# Patient Record
Sex: FEMALE | Race: WHITE | NOT HISPANIC OR LATINO | Employment: FULL TIME | URBAN - METROPOLITAN AREA
[De-identification: names, ages, dates, MRNs, and addresses within clinical notes are randomized per-mention and may not be internally consistent; named-entity substitution may affect disease eponyms.]

---

## 2017-06-29 ENCOUNTER — PATIENT MESSAGE (OUTPATIENT)
Dept: OBSTETRICS AND GYNECOLOGY | Facility: CLINIC | Age: 57
End: 2017-06-29

## 2017-06-30 RX ORDER — ESTRADIOL 0.05 MG/D
1 PATCH TRANSDERMAL
Qty: 4 PATCH | Refills: 1 | Status: SHIPPED | OUTPATIENT
Start: 2017-06-30 | End: 2017-08-03 | Stop reason: SDUPTHER

## 2017-08-03 ENCOUNTER — OFFICE VISIT (OUTPATIENT)
Dept: OBSTETRICS AND GYNECOLOGY | Facility: CLINIC | Age: 57
End: 2017-08-03
Payer: MEDICARE

## 2017-08-03 VITALS
DIASTOLIC BLOOD PRESSURE: 72 MMHG | HEIGHT: 64 IN | BODY MASS INDEX: 33.84 KG/M2 | SYSTOLIC BLOOD PRESSURE: 144 MMHG | WEIGHT: 198.19 LBS

## 2017-08-03 DIAGNOSIS — N95.9 MENOPAUSAL AND POSTMENOPAUSAL DISORDER: ICD-10-CM

## 2017-08-03 DIAGNOSIS — Z12.31 ENCOUNTER FOR SCREENING MAMMOGRAM FOR BREAST CANCER: ICD-10-CM

## 2017-08-03 DIAGNOSIS — Z01.419 WELL WOMAN EXAM WITH ROUTINE GYNECOLOGICAL EXAM: Primary | ICD-10-CM

## 2017-08-03 PROCEDURE — 99213 OFFICE O/P EST LOW 20 MIN: CPT | Mod: PBBFAC | Performed by: OBSTETRICS & GYNECOLOGY

## 2017-08-03 PROCEDURE — 99999 PR PBB SHADOW E&M-EST. PATIENT-LVL III: CPT | Mod: PBBFAC,,, | Performed by: OBSTETRICS & GYNECOLOGY

## 2017-08-03 PROCEDURE — G0101 CA SCREEN;PELVIC/BREAST EXAM: HCPCS | Mod: S$PBB,,, | Performed by: OBSTETRICS & GYNECOLOGY

## 2017-08-03 RX ORDER — FLUTICASONE PROPIONATE 50 MCG
SPRAY, SUSPENSION (ML) NASAL
COMMUNITY
Start: 2017-05-02 | End: 2018-08-17

## 2017-08-03 RX ORDER — MULTIVITAMIN
1 TABLET ORAL
COMMUNITY

## 2017-08-03 RX ORDER — ESTRADIOL 0.05 MG/D
1 PATCH TRANSDERMAL
Qty: 4 PATCH | Refills: 11 | Status: SHIPPED | OUTPATIENT
Start: 2017-08-03 | End: 2018-08-20 | Stop reason: SDUPTHER

## 2017-08-03 RX ORDER — ATORVASTATIN CALCIUM 20 MG/1
20 TABLET, FILM COATED ORAL
COMMUNITY
Start: 2017-07-18

## 2017-08-03 RX ORDER — MEDROXYPROGESTERONE ACETATE 2.5 MG/1
2.5 TABLET ORAL DAILY
Qty: 90 TABLET | Refills: 3 | Status: SHIPPED | OUTPATIENT
Start: 2017-08-03 | End: 2018-10-20 | Stop reason: SDUPTHER

## 2017-08-03 RX ORDER — CHOLECALCIFEROL (VITAMIN D3) 125 MCG
TABLET ORAL
COMMUNITY

## 2017-08-03 RX ORDER — LEVOTHYROXINE SODIUM 137 UG/1
137 TABLET ORAL
Qty: 30 TABLET | Refills: 11 | Status: SHIPPED | OUTPATIENT
Start: 2017-08-03 | End: 2018-08-21 | Stop reason: SDUPTHER

## 2017-08-03 RX ORDER — DEXBROMPHENIRAMINE MALEATE AND PHENYLEPHRINE HYDROCHLORIDE 2; 10 MG/1; MG/1
TABLET ORAL
COMMUNITY
Start: 2017-03-22 | End: 2018-08-17

## 2017-08-03 NOTE — PROGRESS NOTES
HPI:  56 y.o. female patient  presents today for annual exam.  She is  and is on HRT.  No complaints, except has felt very fatigued since thyroid medication was lowered from 137 mcg dose to 125 mcg in 2016.  Last TSH on 17 was 2.59.  Prior TSH on 12/15/16 was 0.342.  Patient states she was feeling really good at that time, and has noted a significant change in energy level since synthroid was lowered.  She is on Estradiol patch 0.05 mg weekly with provera 2.5 mg daily for HRT.  This has significantly improved her menopausal symptoms and she has no concerns with that today.  She would like to continue on HRT.      Past Medical History:   Diagnosis Date    Diabetes mellitus, type 2     Hyperlipidemia     Hypothyroidism     Non Hodgkin's lymphoma        Past Surgical History:   Procedure Laterality Date     SECTION      x3    MYOMECTOMY      PAROTID DUCT LIGATION      TUBAL LIGATION      revirsed       REVIEW OF SYSTEMS:  GENERAL:  No fever, chills, fatigue, or weight loss  ABDOMEN:  Normal appetite, no weight loss or abdominal pain  URINARY:  No flank pain, dysuria, or hematuria  REPRODUCTIVE:  No abnormal vaginal bleeding  BREASTS:  No tenderness, masses, or nipple discharge noted of breasts    PHYSICAL EXAM:    APPEARANCE:  Well nourished, well developed, in no acute distress  NECK:  Symmetric without masses or thyromegaly  BREASTS:  Symmetrical, no skin changes or visible lesions.  No palpable masses, nipple discharge, or adenopathy bilaterally.  ABDOMEN:  Soft, no tenderness or masses, no distension noted  PELVIC:  VULVA:  No lesions.  Normal female genitalia  URETHRAL MEATUS:  Normal size and location.  No lesions.  No prolapse  URETHRA:  No masses, tenderness, prolapse, or scarring  VAGINA:  No lesions or discharge.  No significant cystocele or rectocele  CERVIX:  No lesions.  Normal diameter, no cervical motion tenderness.  UTERUS:  4-6 week size, regular shape, mobile,  non-tender, normal position, good support  ADNEXA:  No masses or tenderness  ANUS AND PERINEUM:  No lesions.  No external hemorrhoids    1. Well woman exam with routine gynecological exam     2. Encounter for screening mammogram for breast cancer  Mammo Digital Screening Bilat with CAD   3. Menopausal and postmenopausal disorder  DXA Bone Density Spine And Hip         PLAN:  MMG and Dexa scan.     Renew HRT.  Due to symptoms since Synthroid was lowered, Synthroid 137 mcg Rx given.  Patient counseled regarding recommended routine health maintenance for her age.

## 2018-08-17 ENCOUNTER — LAB VISIT (OUTPATIENT)
Dept: LAB | Facility: HOSPITAL | Age: 58
End: 2018-08-17
Attending: OBSTETRICS & GYNECOLOGY
Payer: MEDICARE

## 2018-08-17 ENCOUNTER — OFFICE VISIT (OUTPATIENT)
Dept: OBSTETRICS AND GYNECOLOGY | Facility: CLINIC | Age: 58
End: 2018-08-17
Payer: MEDICARE

## 2018-08-17 VITALS
DIASTOLIC BLOOD PRESSURE: 76 MMHG | HEIGHT: 64 IN | BODY MASS INDEX: 33.84 KG/M2 | WEIGHT: 198.19 LBS | SYSTOLIC BLOOD PRESSURE: 130 MMHG

## 2018-08-17 DIAGNOSIS — R53.83 FATIGUE, UNSPECIFIED TYPE: ICD-10-CM

## 2018-08-17 DIAGNOSIS — Z01.419 WELL WOMAN EXAM WITH ROUTINE GYNECOLOGICAL EXAM: ICD-10-CM

## 2018-08-17 DIAGNOSIS — Z01.419 WELL WOMAN EXAM WITH ROUTINE GYNECOLOGICAL EXAM: Primary | ICD-10-CM

## 2018-08-17 DIAGNOSIS — N95.1 MENOPAUSAL SYMPTOMS: ICD-10-CM

## 2018-08-17 LAB
ESTRADIOL SERPL-MCNC: 13 PG/ML
PROGEST SERPL-MCNC: <0.1 NG/ML
TSH SERPL DL<=0.005 MIU/L-ACNC: 0.53 UIU/ML

## 2018-08-17 PROCEDURE — 99999 PR PBB SHADOW E&M-EST. PATIENT-LVL II: CPT | Mod: PBBFAC,,, | Performed by: OBSTETRICS & GYNECOLOGY

## 2018-08-17 PROCEDURE — 36415 COLL VENOUS BLD VENIPUNCTURE: CPT

## 2018-08-17 PROCEDURE — 82670 ASSAY OF TOTAL ESTRADIOL: CPT

## 2018-08-17 PROCEDURE — 84443 ASSAY THYROID STIM HORMONE: CPT

## 2018-08-17 PROCEDURE — G0101 CA SCREEN;PELVIC/BREAST EXAM: HCPCS | Mod: S$PBB,,, | Performed by: OBSTETRICS & GYNECOLOGY

## 2018-08-17 PROCEDURE — 99212 OFFICE O/P EST SF 10 MIN: CPT | Mod: PBBFAC | Performed by: OBSTETRICS & GYNECOLOGY

## 2018-08-17 PROCEDURE — 84144 ASSAY OF PROGESTERONE: CPT

## 2018-08-17 RX ORDER — MUPIROCIN 20 MG/G
OINTMENT TOPICAL
COMMUNITY
Start: 2018-07-02 | End: 2020-03-06

## 2018-08-17 RX ORDER — INSULIN DEGLUDEC 100 U/ML
INJECTION, SOLUTION SUBCUTANEOUS
COMMUNITY
Start: 2018-07-23

## 2018-08-17 NOTE — PROGRESS NOTES
HPI:  57 y.o. female patient  presents today for annual exam.  She is c/o significant fatigue, hot flashes, and night sweats.  She is on synthroid for hypothyroidism.   and on estradiol patch 0.5mg/day with provera 2.5 mg daily for HRT.      Past Medical History:   Diagnosis Date    Diabetes mellitus, type 2     Hyperlipidemia     Hypothyroidism     Non Hodgkin's lymphoma        Past Surgical History:   Procedure Laterality Date     SECTION      x3    MYOMECTOMY      PAROTID DUCT LIGATION      TUBAL LIGATION      revirsed       REVIEW OF SYSTEMS:  GENERAL:  No fever, chills, fatigue, or weight loss  ABDOMEN:  Normal appetite, no weight loss or abdominal pain  URINARY:  No flank pain, dysuria, or hematuria  REPRODUCTIVE:  No abnormal vaginal bleeding  BREASTS:  No tenderness, masses, or nipple discharge noted of breasts    PHYSICAL EXAM:    APPEARANCE:  Well nourished, well developed, in no acute distress  NECK:  Symmetric without masses or thyromegaly  BREASTS:  Symmetrical, no skin changes or visible lesions.  No palpable masses, nipple discharge, or adenopathy bilaterally.  ABDOMEN:  Soft, no tenderness or masses, no distension noted  PELVIC:  VULVA:  No lesions.  Normal female genitalia  URETHRAL MEATUS:  Normal size and location.  No lesions.  No prolapse  URETHRA:  No masses, tenderness, prolapse, or scarring  VAGINA:  No lesions or discharge.  No significant cystocele or rectocele  CERVIX:  No lesions.  Normal diameter, no cervical motion tenderness.  UTERUS:  4-6 week size, regular shape, mobile, non-tender, normal position, good support  ADNEXA:  No masses or tenderness  ANUS AND PERINEUM:  No lesions.  No external hemorrhoids    1. Well woman exam with routine gynecological exam  TSH   2. Menopausal symptoms  Estradiol    Progesterone   3. Fatigue, unspecified type  TSH         PLAN:  MMG and dexa scan are scheduled.    Check labs as listed above.  Patient counseled regarding  recommended routine health maintenance for her age.

## 2018-08-20 RX ORDER — ESTRADIOL 0.05 MG/D
1 PATCH TRANSDERMAL
Qty: 4 PATCH | Refills: 10 | Status: SHIPPED | OUTPATIENT
Start: 2018-08-20 | End: 2019-03-06 | Stop reason: SDUPTHER

## 2018-08-21 RX ORDER — LEVOTHYROXINE SODIUM 137 UG/1
TABLET ORAL
Qty: 30 TABLET | Refills: 11 | Status: SHIPPED | OUTPATIENT
Start: 2018-08-21 | End: 2020-03-06 | Stop reason: SDUPTHER

## 2018-08-29 ENCOUNTER — HOSPITAL ENCOUNTER (OUTPATIENT)
Dept: RADIOLOGY | Facility: HOSPITAL | Age: 58
Discharge: HOME | End: 2018-08-29
Attending: OBSTETRICS & GYNECOLOGY
Payer: MEDICARE

## 2018-08-29 VITALS — HEIGHT: 64 IN | WEIGHT: 198 LBS | BODY MASS INDEX: 33.8 KG/M2

## 2018-08-29 DIAGNOSIS — Z12.31 ENCOUNTER FOR SCREENING MAMMOGRAM FOR BREAST CANCER: ICD-10-CM

## 2018-08-29 PROCEDURE — 77063 BREAST TOMOSYNTHESIS BI: CPT | Mod: TC

## 2018-08-29 PROCEDURE — 77067 SCR MAMMO BI INCL CAD: CPT | Mod: 26,,, | Performed by: RADIOLOGY

## 2018-08-29 PROCEDURE — 77063 BREAST TOMOSYNTHESIS BI: CPT | Mod: 26,,, | Performed by: RADIOLOGY

## 2018-10-20 RX ORDER — MEDROXYPROGESTERONE ACETATE 2.5 MG/1
2.5 TABLET ORAL DAILY
Qty: 90 TABLET | Refills: 3 | Status: SHIPPED | OUTPATIENT
Start: 2018-10-20 | End: 2020-01-13

## 2018-10-20 NOTE — TELEPHONE ENCOUNTER
Attempted to contact patient to notify of prescription refill, no answer. Voicemail has not been set up.

## 2018-11-16 ENCOUNTER — HOSPITAL ENCOUNTER (EMERGENCY)
Facility: HOSPITAL | Age: 58
Discharge: HOME | End: 2018-11-16
Attending: EMERGENCY MEDICINE
Payer: MEDICARE

## 2018-11-16 VITALS
RESPIRATION RATE: 17 BRPM | WEIGHT: 191 LBS | HEART RATE: 75 BPM | DIASTOLIC BLOOD PRESSURE: 74 MMHG | TEMPERATURE: 98 F | SYSTOLIC BLOOD PRESSURE: 167 MMHG | BODY MASS INDEX: 32.61 KG/M2 | HEIGHT: 64 IN | OXYGEN SATURATION: 98 %

## 2018-11-16 DIAGNOSIS — R06.02 SHORTNESS OF BREATH: ICD-10-CM

## 2018-11-16 DIAGNOSIS — R09.1 PLEURISY: Primary | ICD-10-CM

## 2018-11-16 LAB
ALBUMIN SERPL BCP-MCNC: 3.8 G/DL
ALP SERPL-CCNC: 133 U/L
ALT SERPL W/O P-5'-P-CCNC: 46 U/L
ANION GAP SERPL CALC-SCNC: 11 MMOL/L
AST SERPL-CCNC: 42 U/L
BASOPHILS # BLD AUTO: 0.05 K/UL
BASOPHILS NFR BLD: 0.8 %
BILIRUB SERPL-MCNC: 0.7 MG/DL
BNP SERPL-MCNC: <10 PG/ML
BUN SERPL-MCNC: 15 MG/DL
CALCIUM SERPL-MCNC: 10 MG/DL
CHLORIDE SERPL-SCNC: 105 MMOL/L
CO2 SERPL-SCNC: 25 MMOL/L
CREAT SERPL-MCNC: 0.7 MG/DL
D DIMER PPP IA.FEU-MCNC: 1.12 MG/L FEU
DIFFERENTIAL METHOD: ABNORMAL
EOSINOPHIL # BLD AUTO: 0 K/UL
EOSINOPHIL NFR BLD: 0.3 %
ERYTHROCYTE [DISTWIDTH] IN BLOOD BY AUTOMATED COUNT: 16.3 %
EST. GFR  (AFRICAN AMERICAN): >60 ML/MIN/1.73 M^2
EST. GFR  (NON AFRICAN AMERICAN): >60 ML/MIN/1.73 M^2
GLUCOSE SERPL-MCNC: 181 MG/DL
HCT VFR BLD AUTO: 38.8 %
HGB BLD-MCNC: 12.8 G/DL
LYMPHOCYTES # BLD AUTO: 2.2 K/UL
LYMPHOCYTES NFR BLD: 36.6 %
MCH RBC QN AUTO: 29.2 PG
MCHC RBC AUTO-ENTMCNC: 33 G/DL
MCV RBC AUTO: 88 FL
MONOCYTES # BLD AUTO: 0.4 K/UL
MONOCYTES NFR BLD: 6.5 %
NEUTROPHILS # BLD AUTO: 3.3 K/UL
NEUTROPHILS NFR BLD: 55.8 %
PLATELET # BLD AUTO: 128 K/UL
PMV BLD AUTO: 8.4 FL
POTASSIUM SERPL-SCNC: 4.4 MMOL/L
PROT SERPL-MCNC: 8 G/DL
RBC # BLD AUTO: 4.39 M/UL
SODIUM SERPL-SCNC: 141 MMOL/L
TROPONIN I SERPL DL<=0.01 NG/ML-MCNC: <0.006 NG/ML
WBC # BLD AUTO: 5.99 K/UL

## 2018-11-16 PROCEDURE — 99284 EMERGENCY DEPT VISIT MOD MDM: CPT | Mod: 25

## 2018-11-16 PROCEDURE — 96374 THER/PROPH/DIAG INJ IV PUSH: CPT

## 2018-11-16 PROCEDURE — 85025 COMPLETE CBC W/AUTO DIFF WBC: CPT

## 2018-11-16 PROCEDURE — 93010 ELECTROCARDIOGRAM REPORT: CPT | Mod: ,,, | Performed by: INTERNAL MEDICINE

## 2018-11-16 PROCEDURE — 25500020 PHARM REV CODE 255: Performed by: EMERGENCY MEDICINE

## 2018-11-16 PROCEDURE — 36415 COLL VENOUS BLD VENIPUNCTURE: CPT

## 2018-11-16 PROCEDURE — 85379 FIBRIN DEGRADATION QUANT: CPT

## 2018-11-16 PROCEDURE — 93005 ELECTROCARDIOGRAM TRACING: CPT

## 2018-11-16 PROCEDURE — 83880 ASSAY OF NATRIURETIC PEPTIDE: CPT

## 2018-11-16 PROCEDURE — 63600175 PHARM REV CODE 636 W HCPCS: Performed by: EMERGENCY MEDICINE

## 2018-11-16 PROCEDURE — 80053 COMPREHEN METABOLIC PANEL: CPT

## 2018-11-16 PROCEDURE — 84484 ASSAY OF TROPONIN QUANT: CPT

## 2018-11-16 RX ORDER — AZITHROMYCIN 250 MG/1
500 TABLET, FILM COATED ORAL DAILY
COMMUNITY
End: 2020-03-06

## 2018-11-16 RX ORDER — INDOMETHACIN 50 MG/1
50 CAPSULE ORAL 3 TIMES DAILY
COMMUNITY
End: 2020-03-06

## 2018-11-16 RX ORDER — KETOROLAC TROMETHAMINE 30 MG/ML
15 INJECTION, SOLUTION INTRAMUSCULAR; INTRAVENOUS
Status: COMPLETED | OUTPATIENT
Start: 2018-11-16 | End: 2018-11-16

## 2018-11-16 RX ADMIN — KETOROLAC TROMETHAMINE 15 MG: 30 INJECTION INTRAMUSCULAR; INTRAVENOUS at 03:11

## 2018-11-16 RX ADMIN — IOHEXOL 100 ML: 350 INJECTION, SOLUTION INTRAVENOUS at 06:11

## 2018-11-16 NOTE — ED PROVIDER NOTES
"SCRIBE #1 NOTE: I, Daniella Ferrari, am scribing for, and in the presence of, Sharmaine Morrissey MD. I have scribed the entire note.      History      Chief Complaint   Patient presents with    Shortness of Breath     with pain to R side. CXR done yesterday and pt was told it "looked like pleurisy" Fever at home       Review of patient's allergies indicates:   Allergen Reactions    Clindamycin Nausea And Vomiting        HPI   HPI    2018, 2:43 PM   History obtained from the patient      History of Present Illness: Bree Vigil is a 57 y.o. female patient with a PMHx of DM, Hyperlipidemia, Hypothyroidism who presents to the Emergency Department for R lateral pleuritic CP which onset gradually yesterday. Symptoms are constant and moderate in severity. No mitigating factors. Associated sxs include SOB, fever (Tmax 100). Pt reports she saw her PCP yesterday for the same sxs and was diagnosed with Pleurisy. She reports she had a CXR yesterday that was normal. Patient denies any chills, n/v, diaphoresis, cough, BLE edema/pain, extremity weakness/numbness, dizziness, recent travel/long car rides, and all other sxs at this time. Pt denies PMHx of DVT/PE. No further complaints or concerns at this time.       Arrival mode: Personal vehicle      PCP: Daniela Aragon NP       Past Medical History:   Past Medical History:   Diagnosis Date    Diabetes mellitus, type 2     Hyperlipidemia     Hypothyroidism     Non Hodgkin's lymphoma        Past Surgical History:  Past Surgical History:   Procedure Laterality Date    BREAST BIOPSY Left     over 10 yrs ago, benign     SECTION      x3    MYOMECTOMY      PAROTID DUCT LIGATION      TUBAL LIGATION      revirsed         Family History:  Family History   Problem Relation Age of Onset    Alcohol abuse Mother     Diabetes Mother     Drug abuse Mother     Heart disease Mother     Hyperlipidemia Mother     Hypertension Mother     Liver disease Mother     " Alcohol abuse Sister     Drug abuse Sister     Breast cancer Maternal Aunt     Colon cancer Neg Hx     Ovarian cancer Neg Hx        Social History:  Social History     Tobacco Use    Smoking status: Never Smoker    Smokeless tobacco: Never Used   Substance and Sexual Activity    Alcohol use: No     Alcohol/week: 0.0 oz    Drug use: No    Sexual activity: Not Currently       ROS   Review of Systems   Constitutional: Positive for fever (Tmax 100). Negative for chills and diaphoresis.   HENT: Negative for congestion and sore throat.    Respiratory: Positive for shortness of breath. Negative for cough.    Cardiovascular: Positive for chest pain (R lateral). Negative for palpitations and leg swelling.   Gastrointestinal: Negative for nausea and vomiting.   Genitourinary: Negative for dysuria.   Musculoskeletal: Negative for back pain and myalgias.   Skin: Negative for rash.   Neurological: Negative for dizziness, weakness and numbness.   Hematological: Does not bruise/bleed easily.   All other systems reviewed and are negative.    Physical Exam      Initial Vitals [11/16/18 1438]   BP Pulse Resp Temp SpO2   (!) 179/73 92 18 98.4 °F (36.9 °C) 100 %      MAP       --          Physical Exam  Nursing Notes and Vital Signs Reviewed.  Constitutional: Patient is in no acute distress. Well-developed and well-nourished.  Head: Atraumatic. Normocephalic.  Eyes: PERRL. EOM intact. Conjunctivae are not pale. No scleral icterus.  ENT: Mucous membranes are moist. Oropharynx is clear and symmetric.    Neck: Supple. Full ROM. No lymphadenopathy.  Cardiovascular: Regular rate. Regular rhythm. No murmurs, rubs, or gallops. Distal pulses are 2+ and symmetric.  Pulmonary/Chest: No respiratory distress. Clear to auscultation bilaterally. No wheezing or rales.  Abdominal: Soft and non-distended.  There is no tenderness.  No rebound, guarding, or rigidity.   Musculoskeletal: Moves all extremities. No obvious deformities. No edema. No  "calf tenderness.  Skin: Warm and dry.  Neurological:  Alert, awake, and appropriate.  Normal speech.  No acute focal neurological deficits are appreciated.  Psychiatric: Normal affect. Good eye contact. Appropriate in content.    ED Course    Procedures  ED Vital Signs:  Vitals:    11/16/18 1438 11/16/18 1503 11/16/18 1535 11/16/18 1644   BP: (!) 179/73 (!) 168/81     Pulse: 92 82 74    Resp: 18 18     Temp: 98.4 °F (36.9 °C)   97.9 °F (36.6 °C)   TempSrc: Oral   Oral   SpO2: 100% 99%     Weight: 86.6 kg (191 lb)      Height: 5' 4" (1.626 m)       11/16/18 1747 11/16/18 1850   BP: (!) 167/74    Pulse: 73 75   Resp: 20 17   Temp:     TempSrc:     SpO2: 99% 98%   Weight:     Height:         Abnormal Lab Results:  Labs Reviewed   CBC W/ AUTO DIFFERENTIAL - Abnormal; Notable for the following components:       Result Value    RDW 16.3 (*)     Platelets 128 (*)     MPV 8.4 (*)     All other components within normal limits   COMPREHENSIVE METABOLIC PANEL - Abnormal; Notable for the following components:    Glucose 181 (*)     AST 42 (*)     ALT 46 (*)     All other components within normal limits   D DIMER, QUANTITATIVE - Abnormal; Notable for the following components:    D-Dimer 1.12 (*)     All other components within normal limits   TROPONIN I   B-TYPE NATRIURETIC PEPTIDE        All Lab Results:  Results for orders placed or performed during the hospital encounter of 11/16/18   CBC auto differential   Result Value Ref Range    WBC 5.99 3.90 - 12.70 K/uL    RBC 4.39 4.00 - 5.40 M/uL    Hemoglobin 12.8 12.0 - 16.0 g/dL    Hematocrit 38.8 37.0 - 48.5 %    MCV 88 82 - 98 fL    MCH 29.2 27.0 - 31.0 pg    MCHC 33.0 32.0 - 36.0 g/dL    RDW 16.3 (H) 11.5 - 14.5 %    Platelets 128 (L) 150 - 350 K/uL    MPV 8.4 (L) 9.2 - 12.9 fL    Gran # (ANC) 3.3 1.8 - 7.7 K/uL    Lymph # 2.2 1.0 - 4.8 K/uL    Mono # 0.4 0.3 - 1.0 K/uL    Eos # 0.0 0.0 - 0.5 K/uL    Baso # 0.05 0.00 - 0.20 K/uL    Gran% 55.8 38.0 - 73.0 %    Lymph% 36.6 18.0 - " 48.0 %    Mono% 6.5 4.0 - 15.0 %    Eosinophil% 0.3 0.0 - 8.0 %    Basophil% 0.8 0.0 - 1.9 %    Differential Method Automated    Comprehensive metabolic panel   Result Value Ref Range    Sodium 141 136 - 145 mmol/L    Potassium 4.4 3.5 - 5.1 mmol/L    Chloride 105 95 - 110 mmol/L    CO2 25 23 - 29 mmol/L    Glucose 181 (H) 70 - 110 mg/dL    BUN, Bld 15 6 - 20 mg/dL    Creatinine 0.7 0.5 - 1.4 mg/dL    Calcium 10.0 8.7 - 10.5 mg/dL    Total Protein 8.0 6.0 - 8.4 g/dL    Albumin 3.8 3.5 - 5.2 g/dL    Total Bilirubin 0.7 0.1 - 1.0 mg/dL    Alkaline Phosphatase 133 55 - 135 U/L    AST 42 (H) 10 - 40 U/L    ALT 46 (H) 10 - 44 U/L    Anion Gap 11 8 - 16 mmol/L    eGFR if African American >60 >60 mL/min/1.73 m^2    eGFR if non African American >60 >60 mL/min/1.73 m^2   Troponin I #1   Result Value Ref Range    Troponin I <0.006 0.000 - 0.026 ng/mL   B-Type natriuretic peptide (BNP)   Result Value Ref Range    BNP <10 0 - 99 pg/mL   D dimer, quantitative   Result Value Ref Range    D-Dimer 1.12 (H) <0.50 mg/L FEU       Imaging Results:  Imaging Results          CTA Chest Non-Coronary (PE Study) (Final result)  Result time 11/16/18 18:49:28    Final result by Austyn Gomez MD (11/16/18 18:49:28)                 Impression:      1.  No pulmonary embolus.    2.   No abnormality identified within the chest.    All CT scans at this facility are performed  using dose modulation techniques as appropriate to performed exam including the following:  automated exposure control; adjustment of mA and/or kV according to the patients size (this includes techniques or standardized protocols for targeted exams where dose is matched to indication/reason for exam: i.e. extremities or head);  iterative reconstruction technique.      Electronically signed by: Austyn Gomez MD  Date:    11/16/2018  Time:    18:49             Narrative:    EXAMINATION:  CTA CHEST NON CORONARY    CLINICAL HISTORY:  Pleurisy;.  Chest pain.    TECHNIQUE:  Axial CTA  images performed through the chest after the administration of 100 cc intravenous contrast. 3D MIP images were performed and interpreted.    COMPARISON:  None    FINDINGS:  There are no filling defects in the pulmonary arteries.  The pulmonary arteries have a normal caliber.  Normal heart size.  No pericardial effusion.  No mediastinal or hilar lymphadenopathy.    There is no acute lung infiltrate.  No pleural effusion or pneumothorax or pulmonary edema.  Surgical sutures are seen within the right lower lobe.  The osseous structures are unremarkable.  Images of the upper abdomen show no acute abnormality.                               The EKG was ordered, reviewed, and independently interpreted by the ED provider.  Interpretation time: 1455  Rate: 85 BPM  Rhythm: normal sinus rhythm  Interpretation: No acute ST changes. No STEMI.         The Emergency Provider reviewed the vital signs and test results, which are outlined above.    ED Discussion     6:55 PM: Reassessed pt at this time.  Pt states her condition has improved at this time after Tx with Toradol. Discussed with pt all pertinent ED information and results. Discussed pt dx and plan of tx. Gave pt all f/u and return to the ED instructions. All questions and concerns were addressed at this time. Pt expresses understanding of information and instructions, and is comfortable with plan to discharge. Pt is stable for discharge.    I discussed with patient and/or family/caretaker that evaluation in the ED does not suggest any emergent or life threatening medical conditions requiring immediate intervention beyond what was provided in the ED, and I believe patient is safe for discharge.  Regardless, an unremarkable evaluation in the ED does not preclude the development or presence of a serious of life threatening condition. As such, patient was instructed to return immediately for any worsening or change in current symptoms.      ED Medication(s):  Medications    ketorolac injection 15 mg (15 mg Intravenous Given 11/16/18 1540)   omnipaque 350 iohexol 100 mL (100 mLs Intravenous Given 11/16/18 1830)     Current Discharge Medication List   none       Follow-up Information     Daniela Aragon NP. Schedule an appointment as soon as possible for a visit in 2 days.    Specialty:  Family Medicine  Why:  Return to the Emergency Room, If symptoms worsen  Contact information:  [address] [facility]  [address]  [facility] [address]  [phone]                     Medical Decision Making    Medical Decision Making:   Clinical Tests:   Lab Tests: Ordered and Reviewed  Medical Tests: Reviewed and Ordered           Scribe Attestation:   Scribe #1: I performed the above scribed service and the documentation accurately describes the services I performed. I attest to the accuracy of the note.    Attending:   Physician Attestation Statement for Scribe #1: I, Sharmaine Morrissey MD, personally performed the services described in this documentation, as scribed by Daniella Ferrari, in my presence, and it is both accurate and complete.          Clinical Impression       ICD-10-CM ICD-9-CM   1. Pleurisy R09.1 511.0   2. Shortness of breath R06.02 786.05       Disposition:   Disposition: Discharged  Condition: Stable         Sharmaine Morrissey MD  11/18/18 1037

## 2018-11-16 NOTE — ED NOTES
Received t/c from Remy in the lab.  He stated that he is having difficulties by the machine needed to obtain the patient's d-dimer result.  The result will take at least 15 minutes to obtain.

## 2018-11-16 NOTE — ED NOTES
"Pt c/o pain to right chest and back area x 5 days and a blistery rash that started on her scalp x3 days ago and onset to upper body. Patient denies itching and states "my scalp is painful". Patient states she has had low grade fevers onset in the evenings, but not present during the day.     Patient saw a dr yesterday who prescribed a zpack which is started this morning.    Patient moved to ED room 14, patient assisted onto stretcher and changed into a gown. Patient placed on cardiac monitor, continuous pulse oximetry and automatic blood pressure cuff. Bed placed in low locked position, side rails up x 2, call light is within reach of patient or family, orientation to room and explanation of wait provided to family and patient, alarms set and turned on for monitor and pulse ox, awaiting MD evaluation and orders, will continue to monitor.    Patient identifies self as Bree Vigil      LOC: The patient is awake, alert and aware of environment with an appropriate affect, the patient is oriented x 3 and speaking appropriately.  APPEARANCE: Patient resting comfortably and in no acute distress, patient is clean and well groomed, patient's clothing is properly fastened.  SKIN: The skin is warm and dry, color consistent with ethnicity, patient has normal skin turgor and moist mucus membranes, skin intact, no breakdown or bruising noted. Blistery rash noted to upper body and scalp, red and flush with skin.  MUSCULOSKELETAL: Patient moving all extremities well, no obvious swelling or deformities noted.  RESPIRATORY: Airway is open and patent, respirations are spontaneous, patient has a normal effort and rate, no accessory muscle use noted. Pain with inspiratory and expiratory breathing.  CARDIAC: Patient has a normal rate and rhythm, no periphreal edema noted, capillary refill < 3 seconds.  ABDOMEN: Soft and non tender to palpation, no distention noted.  NEUROLOGIC: PERRL, eyes open spontaneously, behavior appropriate to " situation, follows commands, facial expression symmetrical, bilateral hand grasp equal and even, purposeful motor response noted, normal sensation in all extremities when touched with a finger.

## 2019-03-07 RX ORDER — ESTRADIOL 0.05 MG/D
PATCH TRANSDERMAL
Qty: 12 PATCH | Refills: 1 | Status: SHIPPED | OUTPATIENT
Start: 2019-03-07 | End: 2019-10-11 | Stop reason: SDUPTHER

## 2019-10-11 RX ORDER — ESTRADIOL 0.05 MG/D
PATCH TRANSDERMAL
Qty: 12 PATCH | Refills: 1 | Status: SHIPPED | OUTPATIENT
Start: 2019-10-11 | End: 2020-03-06 | Stop reason: SDUPTHER

## 2020-01-13 RX ORDER — MEDROXYPROGESTERONE ACETATE 2.5 MG/1
TABLET ORAL
Qty: 90 TABLET | Refills: 0 | Status: SHIPPED | OUTPATIENT
Start: 2020-01-13 | End: 2020-03-06 | Stop reason: SDUPTHER

## 2020-03-06 ENCOUNTER — OFFICE VISIT (OUTPATIENT)
Dept: OBSTETRICS AND GYNECOLOGY | Facility: CLINIC | Age: 60
End: 2020-03-06
Payer: MEDICARE

## 2020-03-06 VITALS
WEIGHT: 213.88 LBS | BODY MASS INDEX: 36.51 KG/M2 | SYSTOLIC BLOOD PRESSURE: 124 MMHG | HEIGHT: 64 IN | DIASTOLIC BLOOD PRESSURE: 82 MMHG

## 2020-03-06 DIAGNOSIS — N95.1 MENOPAUSAL SYMPTOMS: Primary | ICD-10-CM

## 2020-03-06 DIAGNOSIS — Z12.31 BREAST CANCER SCREENING BY MAMMOGRAM: ICD-10-CM

## 2020-03-06 DIAGNOSIS — Z12.4 PAP SMEAR FOR CERVICAL CANCER SCREENING: ICD-10-CM

## 2020-03-06 PROCEDURE — 99999 PR PBB SHADOW E&M-EST. PATIENT-LVL III: CPT | Mod: PBBFAC,,, | Performed by: OBSTETRICS & GYNECOLOGY

## 2020-03-06 PROCEDURE — 87624 HPV HI-RISK TYP POOLED RSLT: CPT

## 2020-03-06 PROCEDURE — 88175 CYTOPATH C/V AUTO FLUID REDO: CPT

## 2020-03-06 PROCEDURE — 99999 PR PBB SHADOW E&M-EST. PATIENT-LVL III: ICD-10-PCS | Mod: PBBFAC,,, | Performed by: OBSTETRICS & GYNECOLOGY

## 2020-03-06 PROCEDURE — 99213 OFFICE O/P EST LOW 20 MIN: CPT | Mod: S$PBB,,, | Performed by: OBSTETRICS & GYNECOLOGY

## 2020-03-06 PROCEDURE — 99213 OFFICE O/P EST LOW 20 MIN: CPT | Mod: PBBFAC | Performed by: OBSTETRICS & GYNECOLOGY

## 2020-03-06 PROCEDURE — 99213 PR OFFICE/OUTPT VISIT, EST, LEVL III, 20-29 MIN: ICD-10-PCS | Mod: S$PBB,,, | Performed by: OBSTETRICS & GYNECOLOGY

## 2020-03-06 RX ORDER — FUROSEMIDE 20 MG/1
TABLET ORAL
COMMUNITY

## 2020-03-06 RX ORDER — BLOOD SUGAR DIAGNOSTIC
STRIP MISCELLANEOUS
COMMUNITY

## 2020-03-06 RX ORDER — ESTRADIOL 0.05 MG/D
PATCH TRANSDERMAL
Qty: 12 PATCH | Refills: 3 | Status: SHIPPED | OUTPATIENT
Start: 2020-03-06 | End: 2020-04-13

## 2020-03-06 RX ORDER — FLUTICASONE PROPIONATE 50 MCG
SPRAY, SUSPENSION (ML) NASAL
COMMUNITY

## 2020-03-06 RX ORDER — LOSARTAN POTASSIUM 25 MG/1
TABLET ORAL
COMMUNITY
Start: 2020-02-05

## 2020-03-06 RX ORDER — MEDROXYPROGESTERONE ACETATE 2.5 MG/1
2.5 TABLET ORAL DAILY
Qty: 90 TABLET | Refills: 3 | Status: SHIPPED | OUTPATIENT
Start: 2020-03-06

## 2020-03-06 RX ORDER — GABAPENTIN 300 MG/1
CAPSULE ORAL
COMMUNITY
Start: 2019-08-12

## 2020-03-06 RX ORDER — DAPAGLIFLOZIN 10 MG/1
TABLET, FILM COATED ORAL
COMMUNITY
Start: 2020-01-03

## 2020-03-06 RX ORDER — LEVOTHYROXINE SODIUM 137 UG/1
TABLET ORAL
Qty: 90 TABLET | Refills: 0 | Status: SHIPPED | OUTPATIENT
Start: 2020-03-06 | End: 2020-05-29

## 2020-03-07 NOTE — PROGRESS NOTES
HPI:  59 y.o. female patient  presents today for annual exam.  No complaints.  She is  and is currently using estradiol weekly patch 0.05 mg/day as well as provera 2.5 mg.  This is working well to control her menopausal symptoms, and she would like to continue it.  No abnormal bleeding.      Past Medical History:   Diagnosis Date    Diabetes mellitus, type 2     Hyperlipidemia     Hypothyroidism     Non Hodgkin's lymphoma        Past Surgical History:   Procedure Laterality Date    BREAST BIOPSY Left     over 10 yrs ago, benign     SECTION      x3    MYOMECTOMY      PAROTID DUCT LIGATION      TUBAL LIGATION      revirsed       REVIEW OF SYSTEMS:  GENERAL:  No fever, chills, fatigue, or weight loss  ABDOMEN:  Normal appetite, no weight loss or abdominal pain  URINARY:  No flank pain, dysuria, or hematuria  REPRODUCTIVE:  No abnormal vaginal bleeding  BREASTS:  No tenderness, masses, or nipple discharge noted of breasts    PHYSICAL EXAM:    APPEARANCE:  Well nourished, well developed, in no acute distress  NECK:  Symmetric without masses or thyromegaly  BREASTS:  Symmetrical, no skin changes or visible lesions.  No palpable masses, nipple discharge, or adenopathy bilaterally.  ABDOMEN:  Soft, no tenderness or masses, no distension noted  PELVIC:  VULVA:  No lesions.  Normal female genitalia  URETHRAL MEATUS:  Normal size and location.  No lesions.  No prolapse  URETHRA:  No masses, tenderness, prolapse, or scarring  VAGINA:  No lesions or discharge.  No significant cystocele or rectocele  CERVIX:  No lesions.  Normal diameter, no cervical motion tenderness.  UTERUS:  4-6 week size, regular shape, mobile, non-tender, normal position, good support  ADNEXA:  No masses or tenderness  ANUS AND PERINEUM:  No lesions.  No external hemorrhoids    1. Well woman exam with routine gynecological exam  Liquid-Based Pap Smear, Screening    HPV High Risk Genotypes, PCR   2. Breast cancer screening by  mammogram  Mammo Digital Screening Bilat With CAD   3. Menopausal symptoms           PLAN:  MMG ordered.  Pap today.  Patient counseled regarding risks, benefits, and side effects of HRT.  She was advised of potential slight increased risks of blood clots, stroke, and breast cancer with long term use of HRT.  She was also advised of potential side effects of the medication.  Patient counseled regarding recommended routine health maintenance for her age.  HRT renewed.

## 2020-03-14 LAB
HPV HR 12 DNA SPEC QL NAA+PROBE: NEGATIVE
HPV16 AG SPEC QL: NEGATIVE
HPV18 DNA SPEC QL NAA+PROBE: NEGATIVE

## 2020-03-31 LAB
FINAL PATHOLOGIC DIAGNOSIS: NORMAL
Lab: NORMAL

## 2020-04-13 RX ORDER — ESTRADIOL 0.05 MG/D
PATCH TRANSDERMAL
Qty: 12 PATCH | Refills: 3 | Status: SHIPPED | OUTPATIENT
Start: 2020-04-13

## 2020-05-29 RX ORDER — LEVOTHYROXINE SODIUM 137 UG/1
TABLET ORAL
Qty: 90 TABLET | Refills: 3 | Status: SHIPPED | OUTPATIENT
Start: 2020-05-29

## 2022-03-28 ENCOUNTER — HOSPITAL ENCOUNTER (EMERGENCY)
Facility: HOSPITAL | Age: 62
Discharge: HOME | End: 2022-03-28
Attending: STUDENT IN AN ORGANIZED HEALTH CARE EDUCATION/TRAINING PROGRAM
Payer: MEDICARE

## 2022-03-28 VITALS
HEART RATE: 83 BPM | BODY MASS INDEX: 36.37 KG/M2 | RESPIRATION RATE: 20 BRPM | WEIGHT: 213 LBS | DIASTOLIC BLOOD PRESSURE: 67 MMHG | TEMPERATURE: 99 F | SYSTOLIC BLOOD PRESSURE: 148 MMHG | OXYGEN SATURATION: 96 % | HEIGHT: 64 IN

## 2022-03-28 DIAGNOSIS — R06.02 SHORTNESS OF BREATH: ICD-10-CM

## 2022-03-28 DIAGNOSIS — J40 BRONCHITIS: Primary | ICD-10-CM

## 2022-03-28 LAB
ALBUMIN SERPL BCP-MCNC: 3.2 G/DL (ref 3.5–5.2)
ALP SERPL-CCNC: 127 U/L (ref 55–135)
ALT SERPL W/O P-5'-P-CCNC: 29 U/L (ref 10–44)
ANION GAP SERPL CALC-SCNC: 10 MMOL/L (ref 8–16)
AST SERPL-CCNC: 22 U/L (ref 10–40)
BASOPHILS # BLD AUTO: 0.03 K/UL (ref 0–0.2)
BASOPHILS NFR BLD: 0.4 % (ref 0–1.9)
BILIRUB SERPL-MCNC: 0.4 MG/DL (ref 0.1–1)
BNP SERPL-MCNC: <10 PG/ML (ref 0–99)
BUN SERPL-MCNC: 15 MG/DL (ref 6–20)
CALCIUM SERPL-MCNC: 9.4 MG/DL (ref 8.7–10.5)
CHLORIDE SERPL-SCNC: 100 MMOL/L (ref 95–110)
CO2 SERPL-SCNC: 29 MMOL/L (ref 23–29)
CREAT SERPL-MCNC: 0.8 MG/DL (ref 0.5–1.4)
CTP QC/QA: YES
CTP QC/QA: YES
DIFFERENTIAL METHOD: ABNORMAL
EOSINOPHIL # BLD AUTO: 0.1 K/UL (ref 0–0.5)
EOSINOPHIL NFR BLD: 0.8 % (ref 0–8)
ERYTHROCYTE [DISTWIDTH] IN BLOOD BY AUTOMATED COUNT: 15.9 % (ref 11.5–14.5)
EST. GFR  (AFRICAN AMERICAN): >60 ML/MIN/1.73 M^2
EST. GFR  (NON AFRICAN AMERICAN): >60 ML/MIN/1.73 M^2
GLUCOSE SERPL-MCNC: 192 MG/DL (ref 70–110)
HCT VFR BLD AUTO: 34.8 % (ref 37–48.5)
HGB BLD-MCNC: 11 G/DL (ref 12–16)
IMM GRANULOCYTES # BLD AUTO: 0.05 K/UL (ref 0–0.04)
IMM GRANULOCYTES NFR BLD AUTO: 0.6 % (ref 0–0.5)
LYMPHOCYTES # BLD AUTO: 1.4 K/UL (ref 1–4.8)
LYMPHOCYTES NFR BLD: 16.4 % (ref 18–48)
MCH RBC QN AUTO: 28.1 PG (ref 27–31)
MCHC RBC AUTO-ENTMCNC: 31.6 G/DL (ref 32–36)
MCV RBC AUTO: 89 FL (ref 82–98)
MONOCYTES # BLD AUTO: 0.3 K/UL (ref 0.3–1)
MONOCYTES NFR BLD: 3.8 % (ref 4–15)
NEUTROPHILS # BLD AUTO: 6.5 K/UL (ref 1.8–7.7)
NEUTROPHILS NFR BLD: 78 % (ref 38–73)
NRBC BLD-RTO: 0 /100 WBC
PLATELET # BLD AUTO: 176 K/UL (ref 150–450)
PMV BLD AUTO: 8.4 FL (ref 9.2–12.9)
POC MOLECULAR INFLUENZA A AGN: NEGATIVE
POC MOLECULAR INFLUENZA B AGN: NEGATIVE
POTASSIUM SERPL-SCNC: 3.6 MMOL/L (ref 3.5–5.1)
PROT SERPL-MCNC: 7.7 G/DL (ref 6–8.4)
RBC # BLD AUTO: 3.91 M/UL (ref 4–5.4)
SARS-COV-2 RDRP RESP QL NAA+PROBE: NEGATIVE
SODIUM SERPL-SCNC: 139 MMOL/L (ref 136–145)
WBC # BLD AUTO: 8.37 K/UL (ref 3.9–12.7)

## 2022-03-28 PROCEDURE — 99285 EMERGENCY DEPT VISIT HI MDM: CPT | Mod: 25

## 2022-03-28 PROCEDURE — 80053 COMPREHEN METABOLIC PANEL: CPT | Performed by: NURSE PRACTITIONER

## 2022-03-28 PROCEDURE — 96374 THER/PROPH/DIAG INJ IV PUSH: CPT

## 2022-03-28 PROCEDURE — 25000242 PHARM REV CODE 250 ALT 637 W/ HCPCS: Performed by: NURSE PRACTITIONER

## 2022-03-28 PROCEDURE — 63600175 PHARM REV CODE 636 W HCPCS: Performed by: NURSE PRACTITIONER

## 2022-03-28 PROCEDURE — U0002 COVID-19 LAB TEST NON-CDC: HCPCS | Performed by: NURSE PRACTITIONER

## 2022-03-28 PROCEDURE — 93010 EKG 12-LEAD: ICD-10-PCS | Mod: ,,, | Performed by: INTERNAL MEDICINE

## 2022-03-28 PROCEDURE — 93010 ELECTROCARDIOGRAM REPORT: CPT | Mod: ,,, | Performed by: INTERNAL MEDICINE

## 2022-03-28 PROCEDURE — 94761 N-INVAS EAR/PLS OXIMETRY MLT: CPT

## 2022-03-28 PROCEDURE — 93005 ELECTROCARDIOGRAM TRACING: CPT

## 2022-03-28 PROCEDURE — 85025 COMPLETE CBC W/AUTO DIFF WBC: CPT | Performed by: NURSE PRACTITIONER

## 2022-03-28 PROCEDURE — 83880 ASSAY OF NATRIURETIC PEPTIDE: CPT | Performed by: NURSE PRACTITIONER

## 2022-03-28 PROCEDURE — 94640 AIRWAY INHALATION TREATMENT: CPT

## 2022-03-28 RX ORDER — METHYLPREDNISOLONE 4 MG/1
TABLET ORAL
Qty: 21 TABLET | Refills: 0 | Status: SHIPPED | OUTPATIENT
Start: 2022-03-28 | End: 2022-04-18

## 2022-03-28 RX ORDER — ALBUTEROL SULFATE 90 UG/1
1-2 AEROSOL, METERED RESPIRATORY (INHALATION) EVERY 6 HOURS PRN
Qty: 18 G | Refills: 0 | Status: SHIPPED | OUTPATIENT
Start: 2022-03-28 | End: 2023-03-28

## 2022-03-28 RX ORDER — DEXAMETHASONE SODIUM PHOSPHATE 4 MG/ML
8 INJECTION, SOLUTION INTRA-ARTICULAR; INTRALESIONAL; INTRAMUSCULAR; INTRAVENOUS; SOFT TISSUE
Status: COMPLETED | OUTPATIENT
Start: 2022-03-28 | End: 2022-03-28

## 2022-03-28 RX ORDER — IPRATROPIUM BROMIDE AND ALBUTEROL SULFATE 2.5; .5 MG/3ML; MG/3ML
3 SOLUTION RESPIRATORY (INHALATION)
Status: COMPLETED | OUTPATIENT
Start: 2022-03-28 | End: 2022-03-28

## 2022-03-28 RX ADMIN — DEXAMETHASONE SODIUM PHOSPHATE 8 MG: 4 INJECTION INTRA-ARTICULAR; INTRALESIONAL; INTRAMUSCULAR; INTRAVENOUS; SOFT TISSUE at 08:03

## 2022-03-28 RX ADMIN — IPRATROPIUM BROMIDE AND ALBUTEROL SULFATE 3 ML: 2.5; .5 SOLUTION RESPIRATORY (INHALATION) at 07:03

## 2022-03-28 NOTE — Clinical Note
"Bree MERINO"Darline Vigil was seen and treated in our emergency department on 3/28/2022.  She may return to work on 03/31/2022.       If you have any questions or concerns, please don't hesitate to call.      Mariangel Villasenor RN    "

## 2022-03-28 NOTE — ED PROVIDER NOTES
"Encounter Date: 3/28/2022    SCRIBE #1 NOTE: I, Noel Chiu, am scribing for, and in the presence of,  SAGAR Su. I have scribed the following portions of the note - Other sections scribed: HPI, ROS.       History     Chief Complaint   Patient presents with    Shortness of Breath    Cough     Pt here via EMS with c/o SOB and cough x 3 weeks. Pt reports seeing her PCP for same complaint several times and treated with antibiotics, last dose yesterday.      61 y.o. female, with a pertinent past medical history of type 2 diabetes mellitus, hyperlipidemia, hypothyroidism, and Non Hodgkin's lymphoma presents to the ED with a constant cough that has been present for 3 weeks. Pt reports associated intermittent shortness of breath and a "low-grade fever". Pt states that she has been to her PCP for this issue and was treated with Augmentin. Pt states that she took the Augmentin for 7 days and her symptoms only worsened. Pt states that she then went to urgent care and was given Z-pack that finished last night. Pt denies using an inhaler or chest pain. No other exacerbating or alleviating factors. Patient denies other associated symptoms.       The history is provided by the patient. No  was used.     Review of patient's allergies indicates:   Allergen Reactions    Clindamycin Nausea And Vomiting     Past Medical History:   Diagnosis Date    Diabetes mellitus, type 2     Hyperlipidemia     Hypothyroidism     Non Hodgkin's lymphoma      Past Surgical History:   Procedure Laterality Date    BREAST BIOPSY Left     over 10 yrs ago, benign     SECTION      x3    MYOMECTOMY      PAROTID DUCT LIGATION      TUBAL LIGATION      revirsed     Family History   Problem Relation Age of Onset    Alcohol abuse Mother     Diabetes Mother     Drug abuse Mother     Heart disease Mother     Hyperlipidemia Mother     Hypertension Mother     Liver disease Mother     Alcohol abuse " Sister     Drug abuse Sister     Breast cancer Maternal Aunt     Colon cancer Neg Hx     Ovarian cancer Neg Hx      Social History     Tobacco Use    Smoking status: Never Smoker    Smokeless tobacco: Never Used   Substance Use Topics    Alcohol use: No     Alcohol/week: 0.0 standard drinks    Drug use: No     Review of Systems   Constitutional: Positive for fever.   HENT: Negative for sore throat.    Respiratory: Positive for cough and shortness of breath.    Cardiovascular: Negative for chest pain.   Gastrointestinal: Negative for nausea.   Genitourinary: Negative for dysuria.   Musculoskeletal: Negative for back pain.   Skin: Negative for rash.   Neurological: Negative for weakness.   Hematological: Does not bruise/bleed easily.   All other systems reviewed and are negative.    Physical Exam     Initial Vitals [03/28/22 0637]   BP Pulse Resp Temp SpO2   134/64 100 18 99.1 °F (37.3 °C) 99 %      MAP       --         Physical Exam    Nursing note and vitals reviewed.  Constitutional: She appears well-developed and well-nourished.   HENT:   Head: Normocephalic and atraumatic.   Eyes: Conjunctivae and EOM are normal. Pupils are equal, round, and reactive to light.   Neck:   Normal range of motion.  Cardiovascular: Normal rate, regular rhythm, normal heart sounds and intact distal pulses. Exam reveals no gallop and no friction rub.    No murmur heard.  Pulmonary/Chest: No respiratory distress. She has no wheezes. She has no rhonchi. She has no rales. She exhibits no tenderness.   Bronchospasms noted with coughing; pt is speaking in complete sentences without distress; coughing occurs with deep breathing    Abdominal: Abdomen is soft. Bowel sounds are normal. She exhibits no distension and no mass. There is no abdominal tenderness. There is no rebound and no guarding.   Musculoskeletal:         General: No tenderness or edema. Normal range of motion.      Cervical back: Normal range of motion.     Neurological:  She is alert and oriented to person, place, and time. She has normal strength. GCS score is 15. GCS eye subscore is 4. GCS verbal subscore is 5. GCS motor subscore is 6.   Skin: Skin is warm. Capillary refill takes less than 2 seconds.       ED Course   Procedures  Labs Reviewed   CBC W/ AUTO DIFFERENTIAL - Abnormal; Notable for the following components:       Result Value    RBC 3.91 (*)     Hemoglobin 11.0 (*)     Hematocrit 34.8 (*)     MCHC 31.6 (*)     RDW 15.9 (*)     MPV 8.4 (*)     Immature Granulocytes 0.6 (*)     Immature Grans (Abs) 0.05 (*)     Gran % 78.0 (*)     Lymph % 16.4 (*)     Mono % 3.8 (*)     All other components within normal limits   COMPREHENSIVE METABOLIC PANEL - Abnormal; Notable for the following components:    Glucose 192 (*)     Albumin 3.2 (*)     All other components within normal limits   B-TYPE NATRIURETIC PEPTIDE   B-TYPE NATRIURETIC PEPTIDE   SARS-COV-2 RDRP GENE   POCT INFLUENZA A/B MOLECULAR     EKG Readings: (Independently Interpreted)   Initial Reading: No STEMI. Rhythm: Normal Sinus Rhythm. Heart Rate: 83. Ectopy: No Ectopy. Conduction: Normal. ST Segments: Normal ST Segments. T Waves: Normal. Clinical Impression: Normal Sinus Rhythm       Imaging Results          X-Ray Chest 1 View (Final result)  Result time 03/28/22 07:16:13    Final result by Venkata Guido MD (03/28/22 07:16:13)                 Impression:      1. No definite evidence of acute cardiopulmonary disease.  2. Note that several solid pulmonary nodules identified on remote CT imaging of 11/16/2018 are not seen, likely not resolvable by current technique.  Continued imaging surveillance of those nodules would be recommended by CT.      Electronically signed by: Venkata Guido  Date:    03/28/2022  Time:    07:16             Narrative:    EXAMINATION:  XR CHEST 1 VIEW    CLINICAL HISTORY:  shortness of breath;    TECHNIQUE:  Single frontal view of the chest was performed.    COMPARISON:  CTA chest performed  "11/16/2018.    FINDINGS:  Cardiomediastinal contour appears to be grossly within normal limits.  Suture material noted in the right hemithorax.  Additional areas of suspected atelectasis and/or scarring in the mid lower lung zones.  5-6 mm suspected calcified pulmonary nodule projects in the right upper mid lung zone    No definite pneumothorax or pleural effusion.  No acute findings suggested in the visualized abdomen.  Osseous and soft tissue structures appear without definite acute abnormality.                                 Medications   albuterol-ipratropium 2.5 mg-0.5 mg/3 mL nebulizer solution 3 mL (3 mLs Nebulization Given 3/28/22 3400)   dexamethasone injection 8 mg (8 mg Intravenous Given 3/28/22 8833)     Medical Decision Making:   History:   Old Medical Records: I decided to obtain old medical records.  Initial Assessment:   61 y.o. female, with a pertinent past medical history of type 2 diabetes mellitus, hyperlipidemia, hypothyroidism, and Non Hodgkin's lymphoma presents to the ED with a constant cough that has been present for 3 weeks. Pt reports associated intermittent shortness of breath and a "low-grade fever".  Differential Diagnosis:   Differential Diagnosis includes, but is not limited to:  Viral URI, Strep pharyngitis, viral pharyngitis, foreign body aspiration/ingestion, bronchitis, asthma exacerbation, CHF exacerbation, COPD exacerbation, allergy/atopy, influenza, pertussis, PE, pneumonia, lung abscess, fungal infection, TB, epiglottitis.  Clinical Tests:   Lab Tests: Ordered and Reviewed  The following lab test(s) were unremarkable: CBC, CMP and BNP  Radiological Study: Reviewed and Ordered  Medical Tests: Ordered and Reviewed  ED Management:  Pt examined and noted to have brochospasms with coughing. No signs of respiratory distress. CXR and labs WNL. EKG WNL. Pt given steroids and nebulizer treatment and immediately felt better. She will be discharged on a medrol dose cyrus along with an " albuterol inhaler. Pt advised to monitor her blood glucose very close for the next week as the steroids can increase her glucose. Patient given strict return precautions and voiced understanding of all discharge instructions. Pt was stable at discharge.               Scribe Attestation:   Scribe #1: I performed the above scribed service and the documentation accurately describes the services I performed. I attest to the accuracy of the note.        ED Course as of 03/28/22 1044   Mon Mar 28, 2022   0701 BP: 134/64 [AT]   0701 Temp: 99.1 °F (37.3 °C) [AT]   0701 Temp src: Oral [AT]   0701 Pulse: 100 [AT]   0701 Resp: 18 [AT]   0701 SpO2: 99 % [AT]   0717 POC Molecular Influenza A Ag: Negative [AT]   0717 SARS-CoV-2 RNA, Amplification, Qual: Negative [AT]   0717 POC Molecular Influenza B Ag: Negative [AT]   0817 WBC: 8.37 [AT]   0818 Glucose(!): 192 [AT]   0818 Creatinine: 0.8 [AT]   0957 BNP: <10 [AT]      ED Course User Index  [AT] SAGAR Su             Clinical Impression:   Final diagnoses:  [R06.02] Shortness of breath  [J40] Bronchitis (Primary)          ED Disposition Condition    Discharge Stable        ED Prescriptions     Medication Sig Dispense Start Date End Date Auth. Provider    methylPREDNISolone (MEDROL DOSEPACK) 4 mg tablet Use as directed on box 21 tablet 3/28/2022 4/18/2022 SAGAR Su    albuterol (PROVENTIL/VENTOLIN HFA) 90 mcg/actuation inhaler Inhale 1-2 puffs into the lungs every 6 (six) hours as needed for Wheezing or Shortness of Breath. Rescue 18 g 3/28/2022 3/28/2023 SAGAR Su        Follow-up Information     Follow up With Specialties Details Why Contact Info    Daniela Aragon NP Family Medicine Schedule an appointment as soon as possible for a visit  As needed [address] [facility]  [address]  [facility] [address]  [phone]         ICarrol FNP, personally performed the services described in this  documentation. All medical record entries made by the scribe were at my direction and in my presence. I have reviewed the chart and agree that the record reflects my personal performance and is accurate and complete.       Carrol Schulz, ROYAL  03/28/22 1044

## 2022-03-28 NOTE — DISCHARGE INSTRUCTIONS
Please monitor your blood sugar closely for the next week due to you being prescribed steroids.    Thank you for coming to our Emergency Department today. It is important to remember that some problems are difficult to diagnose and may not be found during your Emergency Department visit. Be sure to follow up with your primary care doctor and review all labs/imaging/tests that were performed during this visit with them. Some labs/tests may be outside of the normal range and require non-emergent follow-up and further investigation to help diagnose/exclude/prevent complications or other medical conditions.    If you do not have a primary care doctor, you may contact the one listed on your discharge paperwork or you may also call the [facility] Appointment Desk at [phone] to schedule an appointment and establish care with one. It is important to your health that you have a primary care doctor.    Please take all medications as directed. All medications may potentially have side-effects and it is impossible to predict which medications may give you side-effects or what side-effects (if any) they will give you.. If you feel that you are having a negative effect or side-effect of any medication you should immediately stop taking them and seek medical attention. If you feel that you are having a life-threatening reaction call 911.    Return to the ER with any questions/concerns, new/concerning symptoms, worsening or failure to improve.     Do not drive, swim, climb to height, take a bath or make any important decisions for 24 hours if you have received any pain medications, sedatives or mood altering drugs during your ER visit.